# Patient Record
Sex: FEMALE
[De-identification: names, ages, dates, MRNs, and addresses within clinical notes are randomized per-mention and may not be internally consistent; named-entity substitution may affect disease eponyms.]

---

## 2017-08-12 ENCOUNTER — HEALTH MAINTENANCE LETTER (OUTPATIENT)
Age: 47
End: 2017-08-12

## 2024-12-18 ENCOUNTER — TRANSFERRED RECORDS (OUTPATIENT)
Dept: HEALTH INFORMATION MANAGEMENT | Facility: CLINIC | Age: 54
End: 2024-12-18

## 2024-12-30 ENCOUNTER — TRANSCRIBE ORDERS (OUTPATIENT)
Dept: OTHER | Age: 54
End: 2024-12-30

## 2024-12-30 ENCOUNTER — MEDICAL CORRESPONDENCE (OUTPATIENT)
Dept: HEALTH INFORMATION MANAGEMENT | Facility: CLINIC | Age: 54
End: 2024-12-30

## 2024-12-30 DIAGNOSIS — C55 LEIOMYOSARCOMA OF UTERUS (H): Primary | ICD-10-CM

## 2024-12-31 ENCOUNTER — TELEPHONE (OUTPATIENT)
Dept: PULMONOLOGY | Facility: CLINIC | Age: 54
End: 2024-12-31

## 2024-12-31 NOTE — TELEPHONE ENCOUNTER
Patient confirmed scheduled appointment:  Date: 01/24/2025  Time: 4:00PM  Visit type: NPULM REFERRAL  Provider: TEX  Location: [facility] PULM  Testing/imaging: N/A  Additional notes: Per clinic review, pt can start in gen pulm. Will bring insurance card to appt to scan into chart.

## 2025-01-20 ENCOUNTER — OFFICE VISIT (OUTPATIENT)
Dept: PULMONOLOGY | Facility: CLINIC | Age: 55
End: 2025-01-20
Payer: COMMERCIAL

## 2025-01-20 VITALS
WEIGHT: 157 LBS | HEART RATE: 100 BPM | OXYGEN SATURATION: 97 % | DIASTOLIC BLOOD PRESSURE: 74 MMHG | SYSTOLIC BLOOD PRESSURE: 118 MMHG

## 2025-01-20 DIAGNOSIS — R06.00 DYSPNEA, UNSPECIFIED TYPE: Primary | ICD-10-CM

## 2025-01-20 DIAGNOSIS — J20.9 ACUTE BRONCHITIS, UNSPECIFIED ORGANISM: ICD-10-CM

## 2025-01-20 DIAGNOSIS — R06.00 DYSPNEA: Primary | ICD-10-CM

## 2025-01-20 DIAGNOSIS — C78.01 MALIGNANT NEOPLASM METASTATIC TO BOTH LUNGS (H): ICD-10-CM

## 2025-01-20 DIAGNOSIS — C78.02 MALIGNANT NEOPLASM METASTATIC TO BOTH LUNGS (H): ICD-10-CM

## 2025-01-20 DIAGNOSIS — C55 LEIOMYOSARCOMA OF UTERUS (H): ICD-10-CM

## 2025-01-20 DIAGNOSIS — J39.8 TRACHEAL DEVIATION: ICD-10-CM

## 2025-01-20 RX ORDER — FUROSEMIDE 20 MG/1
20 TABLET ORAL DAILY
COMMUNITY
Start: 2024-12-23

## 2025-01-20 RX ORDER — DEXAMETHASONE 4 MG/1
2 TABLET ORAL
COMMUNITY

## 2025-01-20 RX ORDER — TRAZODONE HYDROCHLORIDE 100 MG/1
100 TABLET ORAL AT BEDTIME
COMMUNITY

## 2025-01-20 RX ORDER — INHALER, ASSIST DEVICES
SPACER (EA) MISCELLANEOUS
Qty: 1 EACH | Refills: 0 | Status: SHIPPED | OUTPATIENT
Start: 2025-01-20

## 2025-01-20 RX ORDER — PROCHLORPERAZINE MALEATE 10 MG
10 TABLET ORAL EVERY 6 HOURS PRN
COMMUNITY
Start: 2025-01-13

## 2025-01-20 RX ORDER — PALONOSETRON HYDROCHLORIDE 0.25 MG/2ML
0.25 INJECTION INTRAVENOUS PRN
COMMUNITY
Start: 2025-02-06

## 2025-01-20 RX ORDER — GABAPENTIN 300 MG/1
1 CAPSULE ORAL DAILY
COMMUNITY
Start: 2024-10-09

## 2025-01-20 RX ORDER — QUETIAPINE FUMARATE 100 MG/1
200 TABLET, FILM COATED ORAL DAILY
COMMUNITY
Start: 2024-10-31

## 2025-01-20 RX ORDER — ESCITALOPRAM OXALATE 5 MG/1
5 TABLET ORAL DAILY
COMMUNITY
Start: 2024-06-13

## 2025-01-20 RX ORDER — SENNOSIDES 8.6 MG/1
1 TABLET, COATED ORAL DAILY PRN
COMMUNITY
Start: 2024-08-05

## 2025-01-20 RX ORDER — HYDROMORPHONE HYDROCHLORIDE 2 MG/1
2 TABLET ORAL EVERY 6 HOURS PRN
COMMUNITY

## 2025-01-20 RX ORDER — ALPRAZOLAM 0.5 MG
0.5 TABLET ORAL
COMMUNITY
Start: 2024-06-13

## 2025-01-20 RX ORDER — ARIPIPRAZOLE 2 MG/1
2 TABLET ORAL DAILY
COMMUNITY
Start: 2024-06-13

## 2025-01-20 RX ORDER — BENZONATATE 100 MG/1
1 CAPSULE ORAL 3 TIMES DAILY PRN
COMMUNITY
Start: 2024-12-30

## 2025-01-20 RX ORDER — HYDROCORTISONE SODIUM SUCCINATE 100 MG/2ML
100 INJECTION INTRAMUSCULAR; INTRAVENOUS PRN
COMMUNITY
Start: 2024-10-09

## 2025-01-20 RX ORDER — ONDANSETRON 8 MG/1
8 TABLET, ORALLY DISINTEGRATING ORAL EVERY 8 HOURS PRN
COMMUNITY

## 2025-01-20 RX ORDER — HYDROCORTISONE 25 MG/G
CREAM TOPICAL PRN
COMMUNITY

## 2025-01-20 RX ORDER — LANOLIN ALCOHOL/MO/W.PET/CERES
400 CREAM (GRAM) TOPICAL DAILY
COMMUNITY
Start: 2024-01-29

## 2025-01-20 RX ORDER — VITAMIN B COMPLEX
5 TABLET ORAL DAILY
COMMUNITY

## 2025-01-20 RX ORDER — CYCLOBENZAPRINE HCL 5 MG
5 TABLET ORAL 3 TIMES DAILY PRN
COMMUNITY

## 2025-01-20 RX ORDER — LEVOTHYROXINE SODIUM 200 UG/1
200 TABLET ORAL
COMMUNITY
Start: 2025-01-02

## 2025-01-20 RX ORDER — PEGFILGRASTIM 6 MG/.6ML
6 INJECTION SUBCUTANEOUS PRN
COMMUNITY
Start: 2025-01-20

## 2025-01-20 RX ORDER — ALBUTEROL SULFATE 90 UG/1
2 INHALANT RESPIRATORY (INHALATION) EVERY 6 HOURS PRN
Qty: 18 G | Refills: 12 | Status: SHIPPED | OUTPATIENT
Start: 2025-01-20

## 2025-01-20 RX ORDER — UBIDECARENONE 75 MG
100 CAPSULE ORAL DAILY
COMMUNITY

## 2025-01-20 RX ORDER — DOXYCYCLINE 100 MG/1
100 CAPSULE ORAL 2 TIMES DAILY
Qty: 14 CAPSULE | Refills: 0 | Status: SHIPPED | OUTPATIENT
Start: 2025-01-20

## 2025-01-20 RX ORDER — ACETAMINOPHEN 500 MG
1000 TABLET ORAL EVERY 6 HOURS PRN
COMMUNITY

## 2025-01-20 RX ORDER — FERROUS SULFATE 325(65) MG
325 TABLET ORAL 2 TIMES DAILY
COMMUNITY
Start: 2024-10-23

## 2025-01-20 RX ORDER — ZOLPIDEM TARTRATE 12.5 MG/1
12.5 TABLET, FILM COATED, EXTENDED RELEASE ORAL
COMMUNITY

## 2025-01-20 NOTE — PROGRESS NOTES
PULMONARY OUTPATIENT CONSULT NOTE        Assessment:      Dyspnea   Related to progression metastasis both lungs.   Large RUL mass is deviating trachea and esophagus with shifting of mediastinum plays a major role, moderate right pleural effusion doubt is another contributor to her dyspnea. Patient is already scheduled for right side therapeutic thoracentesis.   Palliative surgical resection of large RUL mass is not on the table. Anyway I will discuss care with thoracic surgery.   Patient has received radiation therapy to the chest on 12/2024 and started on second line chemotherapy on 1/13/2025.   No desaturation with activities.   Denies sleeping problems.   Patient is scheduled for follow up CT scan to assess response to chemoradiation therapy in the next 3 months.   Acute bronchitis   Reports mild increase productive cough of yellow sputum. Denies fever, chills and night sweats.   Abx doxycycline , albuterol HFA as needed.   Patient has received dexamethasone with chemotherapy.   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   S/p radiation to chest and liver in an attempt to decrease metastasis and started on second line therapy with doxorubicin and dacarbazine 1/13/2025     Plan:      Albuterol HFA two puffs every six hours as needed, with spacer, cough or shortness of breath   Doxycycline 100 mg twice a day for 7 days   Patient is scheduled for thoracentesis   Contact me in 2 weeks to let me know how are you doing , clinic [phone], nurse Flores  Follow up in three months        Miko Anguiano  Pulmonary / Critical Care  January 20, 2025        CC:     Chief  Complaint   Patient presents with    Consult     CONSULT:  Per review, pt can be seen in gen pulm // DX: Symptomatic lung metastasis, Help with symptom management         HPI:      Kathleen Angel is a 54 year old female who presents for evaluation of dyspnea  Patient has history of Graves disease s/p ablation, hypothyroidism, paranoid schizophrenia, GERD, nephrolithiasis, uterine fibroids, later diagnosed metastatic leiomyosarcoma to liver, lungs and bones diagnosed on August 2024 s/p chemotherapy with Gemcitabine / Docetaxel and pelvic radiation, developed obstructive hydronephrosis left kidney due to local mass effect, concern for calyceal rupture, requiring left nephrostomy tube placement on 9/2024 and removed on 10/15/2024, peripheral neuropathy post chemotherapy. .   Follow up CT scan on 12/2024 showed progression of disease diffuse pulmonary metastasis, large RUL mass deviating trachea and esophagus with shifting of mediastinum, also increase hepatic metastasis, right pleural effusion and pericardial effusion.   Patient has received radiation to chest and liver in an attempt to decrease metastasis and started on second line therapy with doxorubicin and dacarbazine     Patient was referred for evaluation of ongoing dyspnea.   A new recent CT scan 1/7/25 showed interval progression of metastatic disease, diffuse pulmonary metastasis, large RUL mass deviating trachea and esophagus with shifting of mediastinum, also increase hepatic metastasis, right pleural effusion and pericardial effusion.   Patient is already scheduled for right side thoracentesis.   Reports progressive exertional dyspnea, worse over the last three weeks, able to walk a a half block before stopping. Patient was recently prescribed a wheelchair thru PCP.   Reports productive cough of yellowish sputum over the last week.   Denies fever, chills or night sweats. Denies hemoptysis.   No history of asthma or outdoor allergies. Denies postnasal drip,  headaches, sinus tenderness. Denies using inhalers.   Denies chest pain, orthopnea, PND, sleeps using one pillow, denies swelling of LEs   Wakes up least once to use the restroom at night.   No tobacco use.       Past Medical History :     Graves disease s/p ablation, hypothyroidism, paranoid schizophrenia, GERD, nephrolithiasis, uterine fibroids metastatic leiomyosarcoma to liver, lungs and bones diagnosed on August 2024 s/p chemotherapy with Gemcitabine / Docetaxel and pelvic radiation, developed obstructive hydronephrosis left kidney due to local mass effect, concern for calyceal rupture, requiring left nephrostomy tube placement on 9/2024 and removed on 10/15/2024, peripheral neuropathy post chemotherapy. .   Follow up CT scan on 12/2024 showed progression of disease diffuse pulmonary metastasis, large RUL mass deviating trachea and esophagus with shifting of mediastinum, also increase hepatic metastasis, right pleural effusion and pericardial effusion.   Patient has received radiation to chest and liver in an attempt to decrease metastasis and started on second line therapy with doxorubicin and dacarbazine     Medications:     Current Outpatient Medications   Medication Sig Dispense Refill    acetaminophen (TYLENOL) 500 MG tablet Take 1,000 mg by mouth every 6 hours as needed.      albuterol (PROAIR HFA/PROVENTIL HFA/VENTOLIN HFA) 108 (90 Base) MCG/ACT inhaler Inhale 2 puffs into the lungs every 6 hours as needed for shortness of breath, wheezing or cough. 18 g 12    ALPRAZolam (XANAX) 0.5 MG tablet Take 0.5 mg by mouth nightly as needed.      ARIPiprazole (ABILIFY) 2 MG tablet Take 2 mg by mouth daily.      benzonatate (TESSALON) 100 MG capsule Take 1 capsule by mouth 3 times daily as needed.      cyanocobalamin (VITAMIN B-12) 100 MCG tablet Take 100 mcg by mouth daily.      dexAMETHasone (DECADRON) 4 MG tablet Take 2 mg by mouth daily (with breakfast).      doxycycline hyclate (VIBRAMYCIN) 100 MG capsule Take  1 capsule (100 mg) by mouth 2 times daily. 14 capsule 0    escitalopram (LEXAPRO) 5 MG tablet Take 5 mg by mouth daily.      ferrous sulfate (FEROSUL) 325 (65 Fe) MG tablet Take 325 mg by mouth 2 times daily.      furosemide (LASIX) 20 MG tablet Take 20 mg by mouth daily.      gabapentin (NEURONTIN) 300 MG capsule Take 1 capsule by mouth daily.      HYDROmorphone (DILAUDID) 2 MG tablet Take 2 mg by mouth every 6 hours as needed for severe pain.      levothyroxine (SYNTHROID/LEVOTHROID) 200 MCG tablet Take 200 mcg by mouth every morning (before breakfast).      magic mouthwash suspension (diphenhydrAMINE, lidocaine, aluminum-magnesium & simethicone) Swish and swallow 10 mLs in mouth every 6 hours as needed for mouth sores.      melatonin 5 MG tablet Take 5 mg by mouth nightly as needed for sleep.      NONFORMULARY Chemo drug: doxorubicin and dacarbazine      ondansetron (ZOFRAN ODT) 8 MG ODT tab Take 8 mg by mouth every 8 hours as needed.      prochlorperazine (COMPAZINE) 10 MG tablet Take 10 mg by mouth every 6 hours as needed.      QUEtiapine (SEROQUEL) 100 MG tablet Take 200 mg by mouth daily. 800mg at HS      SENNA-TIME 8.6 MG tablet Take 1 tablet by mouth daily as needed.      spacer (OPTICHAMBER DINORAH) holding chamber Use it with inhaler 1 each 0    traZODone (DESYREL) 100 MG tablet Take 100 mg by mouth at bedtime.      Vitamin D3 (VITAMIN D, CHOLECALCIFEROL,) 25 mcg (1000 units) tablet Take 5 tablets by mouth daily.      zolpidem ER (AMBIEN CR) 12.5 MG CR tablet Take 12.5 mg by mouth nightly as needed for sleep.      [START ON 2/3/2025] aprepitant (CINVANTI) 130 MG/18ML EMUL injection Inject 130 mg into the vein once.      cyclobenzaprine (FLEXERIL) 5 MG tablet Take 5 mg by mouth 3 times daily as needed.      diphenhydrAMINE HCl (DIPHEN PO) Inject 50 mg into the vein as needed.      hydrocortisone 2.5 % cream Apply topically as needed.      hydrocortisone sodium succinate PF (SOLU-CORTEF) 100 mg/2 mL  injection Inject 100 mg into the vein as needed. (Patient not taking: Reported on 1/20/2025)      MAGNESIUM OXIDE 400 (240 Mg) MG tablet Take 400 mg by mouth daily. (Patient not taking: Reported on 1/20/2025)      methylprednisoLONE sodium succinate (SOLU-MEDROL) 2000 MG injection Inject 125 mg into the vein as needed. (Patient not taking: Reported on 1/20/2025)      [START ON 2/6/2025] Palonosetron HCl 0.25 MG/2ML SOLN Inject 0.25 mg into the vein as needed. (Patient not taking: Reported on 1/20/2025)      pegfilgrastim-jmdb (FULPHILA) 6 MG/0.6ML injection Inject 6 mg subcutaneously as needed. (Patient not taking: Reported on 1/20/2025)      spacer (OPTICHAMBER DINORAH) holding chamber Use with albuterol inhaler 1 each 0     No current facility-administered medications for this visit.        Social History :     Social History     Socioeconomic History    Marital status:      Spouse name: Not on file    Number of children: Not on file    Years of education: Not on file    Highest education level: Not on file   Occupational History    Not on file   Tobacco Use    Smoking status: Never    Smokeless tobacco: Never   Vaping Use    Vaping status: Never Used   Substance and Sexual Activity    Alcohol use: Not on file    Drug use: Not on file    Sexual activity: Not on file   Other Topics Concern    Not on file   Social History Narrative    Not on file     Social Drivers of Health     Financial Resource Strain: Low Risk  (6/28/2024)    Received from [facility]    Financial Resource Strain     Difficulty of Paying Living Expenses: 3     Difficulty of Paying Living Expenses: Not on file   Food Insecurity: No Food Insecurity (10/15/2024)    Received from [facility]    Food Insecurity     Do you worry your food will run out before you are able to buy more?: 1   Transportation Needs: No Transportation Needs (10/15/2024)    Received from [facility]    Transportation Needs     Does lack of transportation keep you from medical appointments?: 1     Does lack of transportation keep you from work, meetings or getting things that you need?: 1   Physical Activity: Not on file   Stress: Not on file   Social Connections: Socially Integrated (10/15/2024)    Received from [email or web address]    Social Connections     Do you often feel lonely or isolated from those around you?: 0   Interpersonal Safety: Not on file   Housing Stability: Low Risk  (10/15/2024)    Received from [email or web address]    Housing Stability     What is your housing situation today?: 1          Family History :     No family history on file.    Review of Systems  A 12 point comprehensive review of systems was negative except as noted.        Objective:     /74   Pulse 100   Wt 71.2 kg (157 lb)   SpO2 97%     Patient oxygen saturation on RA at rest is 97% pulse 99  Oxygen saturation after ambulating 400ft on RA is 98% pulse 113    Gen: awake, alert, no distress  HEENT: pink conjunctiva, moist mucosa, Mallampati II/IV  Neck: no thyromegaly, masses or JVD  Lungs: decrease breath sounds right base, discrete ronchi both HT  CV: regular, no murmurs or gallops appreciated  Abdomen: soft, NT, BS wnl  Ext: edema 1+ both legs  Neuro: awake, alert, non focal      Diagnostic tests:       IMAGES:     CT CHEST ABDOMEN PELVIS W   LOCATION: [facility]   DATE: 1/6/2025   INDICATION: Leiomyosarcoma Of Uterus (hc)   COMPARISON: Multiple priors, most recent 12/13/2024   FINDINGS:   LUNGS AND PLEURA: Interval increase in now moderate right-sided pleural fluid collection. Interval increase in size of the large right upper lung mass associated with the pleura with extension into the mediastinum. On axial images this measures 12.1 x 8.7 cm, previously 11.5 x 8.3 cm. Interval increase in size of pulmonary masses in the lung bases  with increasing atelectasis.   MEDIASTINUM/AXILLAE: Marked pericardial fluid. Normal caliber aorta. Right-sided Port-A-Cath.   CORONARY ARTERY CALCIFICATION: None.   HEPATOBILIARY: Minimal interval increase in size of hepatic metastasis measuring 9 x 8.9 cm on axial imaging. Cholecystectomy. No biliary dilatation.   PANCREAS: Normal.   SPLEEN: Normal.   ADRENAL GLANDS: Normal.   KIDNEYS/BLADDER: Symmetric renal enhancement. No urinary collecting system dilatation. Mild bladder wall thickening.   BOWEL: No obstruction or inflammatory change. Large amount of stool in the colon.   LYMPH NODES: No new lymphadenopathy. Aortocaval lymph nodes remain unchanged in size.   VASCULATURE: Normal caliber aorta. Patent celiac, SMA, bilateral renal arteries and TALAT. Patent portal, splenic and superior mesenteric veins.   PELVIC ORGANS: Essentially unchanged heterogeneously enhancing uterine mass measuring approximately 11 x 13 cm on axial imaging.   MUSCULOSKELETAL: Stable left lytic bone metastasis measuring 2.4 cm. Degenerative hypertrophic changes in the spine. Chronic bilateral L5 pars interarticularis defects.   Impression:  1.  Interval increase in size of pulmonary metastasis as detailed above.  2.  Interval increase in size of hepatic metastasis.  3.  Heterogeneously enhancing uterine mass essentially unchanged since prior study.  4.  Interval increase in atelectasis involving the right lung base with associated moderate right-sided pleural fluid.   5.  Stable left lytic bone metastasis.

## 2025-01-20 NOTE — PATIENT INSTRUCTIONS
Albuterol HFA two puffs every six hours as needed, with spacer, cough or shortness of breath   Doxycycline 100 mg twice a day for 7 days   Contact ,me in 2 weeks to let me know how are you doing , clinic [phone], nurse Mark  Follow up in three months

## 2025-01-20 NOTE — PROGRESS NOTES
Patient oxygen saturation on RA at rest is 97% pulse 99  Oxygen saturation after ambulating 400ft on RA is 98% pulse 113      Patient is ambulatory within his/her home.      Crystal Rowley LPN

## 2025-01-22 ENCOUNTER — MEDICAL CORRESPONDENCE (OUTPATIENT)
Dept: HEALTH INFORMATION MANAGEMENT | Facility: CLINIC | Age: 55
End: 2025-01-22
Payer: COMMERCIAL

## 2025-01-22 ENCOUNTER — TRANSFERRED RECORDS (OUTPATIENT)
Dept: HEALTH INFORMATION MANAGEMENT | Facility: CLINIC | Age: 55
End: 2025-01-22
Payer: COMMERCIAL

## 2025-01-28 ENCOUNTER — PATIENT OUTREACH (OUTPATIENT)
Dept: ONCOLOGY | Facility: CLINIC | Age: 55
End: 2025-01-28
Payer: COMMERCIAL

## 2025-01-28 ENCOUNTER — TRANSCRIBE ORDERS (OUTPATIENT)
Dept: OTHER | Age: 55
End: 2025-01-28

## 2025-01-28 DIAGNOSIS — C55 LEIOMYOSARCOMA OF UTERUS (H): Primary | ICD-10-CM

## 2025-01-28 DIAGNOSIS — C54.9: Primary | ICD-10-CM

## 2025-01-28 NOTE — PROGRESS NOTES
New Patient Hematology / Oncology Nurse Navigator Note     Referral Date: 1/28/25    Referring provider: Dr MARGARET Bernard    Referring Clinic/Organization: [facility]      Referred to: Medical Oncology    Requested provider (if applicable): Dr. Gottlieb    Evaluation for : Second opinion of metastatic uterine leiomyosarcoma for possible clinical trials or third line therapy consult     Clinical History (per Nurse review of records provided):    Records available through [facility]    Clinical Assessment / Barriers to Care (Per Nurse):    None identified at time of call, discuss referral concerns and what to expect at initial consult visit.      Records Location: Care Everywhere     Records Needed:     See Pre-Visit encounter from CSS team for additional details on records collection. Additional questions on records needed for initial consult can be sent to  ONC ADULT NEW PATIENT RECORDS [[id number]] with a copy to  CANCER CARE NURSE NAVIGATION [[id number]]. Please include diagnosis and urgency in subject line.    Additional testing needed prior to consult:     N/A    Referral updates and Plan:     Triage instructions updated and sent to NPS for completion      CELE GoodmanN, RN, PHN, OCN  Hematology/Oncology Nurse Navigator   [facility]   [phone]   [email or web address]

## 2025-02-10 NOTE — TELEPHONE ENCOUNTER
RECORDS STATUS - ALL OTHER DIAGNOSIS      RECORDS RECEIVED FROM: Ext: [facility]   NOTES STATUS DETAILS   OFFICE NOTE from referring provider Ext: [facility] 1/22/2025 - Dr. Makenzie Bernard   OFFICE NOTE from medical oncologist [facility]  8/23/2024 - Dr. Fang Mckeon   MEDICATION LIST [facility]    LABS     PATHOLOGY REPORTS Req from [facility]  8/29/2024 - [id number]   6/28/2024 - [id number]    ANYTHING RELATED TO DIAGNOSIS Ext: [facility] 1/13/2025   PATHOLOGY FEDEX TRACKING   TRACKING #:   IMAGING (NEED IMAGES & REPORT)     CT SCANS PACS    Req from [facility] PACS:  CT CAP: 1/6/2025, 12/13/2024  CT Chest: 8/13/2024    [facility]:  CT Abdomen Pelvis: 10/4/2024    [facility]:   CT Abdomen Pelvis: 8/6/2024   MRI Req from [facility]:   MR Pelvis: 5/27/2021   XRAYS     [facility] Liver: 8/29/2024  US Pelvis: 7/26/2024, 3/1/2024, 4/5/2021, 6/12/2020, 3/18/2020  US Abdomen: 5/13/2022    [facility]:   US Abdomen: 2/22/2022  US Pelvis: 5/12/2021   PET PACS PET Skull Base to Mid Thigh: 8/20/2024   IMAGE DISC FEDEX TRACKING   TRACKING #:

## 2025-03-10 NOTE — PROGRESS NOTES
"      3-12-25    I saw Carola Angel for a second opinion regarding a metastatic uterine leiomyosarcoma.  Her history is a bit complicated as described in the outside notes but in brief she was found to have a metastatic uterine leiomyosarcoma in August 2024.  She had a large abdominal mass that was painful which was treated with radiation therapy with decreasing pain.  On October 9 she began treatment with gemzar and Taxotere but progression was noted in December and she received radiation to the right upper lobe and liver lesion finishing on January 2.  She started doxorubicin and dacarbazine on January 13, 2025.  She has had 3 cycles the last ending about 10 days ago.  She has tolerated current treatment fairly well.  During the last couple of days she has developed some left-sided frontal and left side headaches and today has noticed double vision.  Her balance has also been off the last couple of days although she does seem to have a history of left foot drop that she was not aware of.  She is not eating that well but is sleeping okay and did actually do 2 miles on a stationary bike on Sunday.  She can walk around in a store okay with a cart and can go up 1 floor without stopping.  She does have a nonproductive cough and the abdominal mass she thinks is a bit more painful than a month ago.  She has some heartburn but has not done any treatment for that.  She does have bilateral numbness of fingers 4 and 5 and also some foot neuropathy that seemed to develop with the Taxotere mostly on the left.  Despite her psychiatric history she feels her mood today is \"fine.\"  She is actually seeing a dentist in the next couple of days and I asked her to see if she can get dental approval for the use of Xgeva should that be deemed useful in the future.    Her past history is well-documented in the accompanying notes and is notable for hypothyroidism, paranoid schizophrenia, anxiety depression, fibromyalgia, multiple surgical " "procedures including loli, T&A.    Family history is not particular contributory.    Social history reveals she is  and came with her  and sister.  She is a never smoker and is a .    NKDA    On exam she appeared comfortable with normal affect  /71 (BP Location: Right arm, Patient Position: Sitting, Cuff Size: Adult Regular)   Pulse (!) 121   Temp 99.9  F (37.7  C) (Oral)   Resp 16   Ht 1.6 m (5' 3\")   Wt 71.6 kg (157 lb 14.4 oz)   SpO2 97%   BMI 27.97 kg/m    HEENT full EOMs PER  NECK no obvious goiter or mass  CHEST no respiratory distress  NEURO normal mentation and speech, Romberg was fairly good that she could not heel walk and has foot drop on the left.  PSYCH appropriate mood    -  Outside NGS reported as showing a p53 mutation, ANJU, TMB low, ATRX mutation, in the ED 12 mutation, deletion of RB.    -  On 1-31 creat 0.72    -  I reviewed the available images showing the metastatic disease and large abd necrotic mass.  The images of 3-10-25 were not available but the preliminary read indicates progression.    CT-AP  10-4-24    1.  The uterine mass (leiomyosarcoma) remains overall stable in size in comparison to the MRI from September.     2.  Interval placement of left retroperitoneal drain with resolution of previously identified urinoma. Interval placement of left percutaneous nephrostomy catheter. The renal collecting system is decompressed.     3.  Enlarging right hepatic lobe metastasis.     4.  Enlarging and new pulmonary metastases.     5.  Enlarging lytic lesion in the left iliac wing, suspicious for metastatic disease   -  CT-AP 1-6-25  1.  Interval increase in size of pulmonary metastasis as detailed above.     2.  Interval increase in size of hepatic metastasis.     3.  Heterogeneously enhancing uterine mass essentially unchanged since prior study.     4.  Interval increase in atelectasis involving the right lung base with associated moderate " right-sided pleural fluid.     5.  Stable left lytic bone metastasis.   -    CT-CAP 3-10-25  IMPRESSION:  1. Progressive pulmonary metastatic disease.  2. Progressive hepatic metastases.  3. The necrotic uterine mass has decreased slightly in size.  4. Progressive destructive left iliac bone metastasis and new, small, lytic right iliac bone metastasis.  5. Nonobstructing left intrarenal calculi.     -  We discussed the situation.    Given the possible progression of the bone metastases I suggested she get approval from her dentist for Xgeva whom she is seeing in the next couple of days.    For the leiomyosarcoma I suggested trabectedin and briefly went over the toxicity of that and how it works.  Subsequent treatments could include pazopanib or eribulin.    I told her I was concerned with the new development of headaches and double vision and would like to get a head CT emergently.  Due to her insurance we will arrange that at [place] that has been set up for today.  She will follow-up with her oncologist on that.    All of their questions were addressed they will call if others arise.  t> 60 minutes including chart image review orders and documentation.    Bobby Gottlieb M.D.  Professor  Hematology, Oncology and Transplantation

## 2025-03-12 ENCOUNTER — PRE VISIT (OUTPATIENT)
Dept: ONCOLOGY | Facility: CLINIC | Age: 55
End: 2025-03-12
Payer: COMMERCIAL

## 2025-03-12 ENCOUNTER — TRANSCRIBE ORDERS (OUTPATIENT)
Dept: OTHER | Age: 55
End: 2025-03-12

## 2025-03-12 ENCOUNTER — ONCOLOGY VISIT (OUTPATIENT)
Dept: ONCOLOGY | Facility: CLINIC | Age: 55
End: 2025-03-12
Attending: OBSTETRICS & GYNECOLOGY
Payer: COMMERCIAL

## 2025-03-12 VITALS
DIASTOLIC BLOOD PRESSURE: 71 MMHG | RESPIRATION RATE: 16 BRPM | OXYGEN SATURATION: 97 % | WEIGHT: 157.9 LBS | HEIGHT: 63 IN | TEMPERATURE: 99.9 F | BODY MASS INDEX: 27.98 KG/M2 | SYSTOLIC BLOOD PRESSURE: 115 MMHG | HEART RATE: 121 BPM

## 2025-03-12 DIAGNOSIS — C78.01 MALIGNANT NEOPLASM METASTATIC TO BOTH LUNGS (H): Primary | ICD-10-CM

## 2025-03-12 DIAGNOSIS — H53.2 DOUBLE VISION: ICD-10-CM

## 2025-03-12 DIAGNOSIS — C49.A0 GIST (GASTROINTESTINAL STROMAL TUMOR), MALIGNANT (H): Primary | ICD-10-CM

## 2025-03-12 DIAGNOSIS — R51.9 ACUTE NONINTRACTABLE HEADACHE, UNSPECIFIED HEADACHE TYPE: ICD-10-CM

## 2025-03-12 DIAGNOSIS — C49.9 LEIOMYOSARCOMA (H): ICD-10-CM

## 2025-03-12 DIAGNOSIS — C78.02 MALIGNANT NEOPLASM METASTATIC TO BOTH LUNGS (H): Primary | ICD-10-CM

## 2025-03-12 PROCEDURE — 99213 OFFICE O/P EST LOW 20 MIN: CPT | Performed by: INTERNAL MEDICINE

## 2025-03-12 RX ORDER — QUETIAPINE FUMARATE 200 MG/1
TABLET, FILM COATED ORAL
COMMUNITY
Start: 2025-02-03

## 2025-03-12 RX ORDER — GABAPENTIN 600 MG/1
TABLET ORAL
COMMUNITY
Start: 2025-03-02

## 2025-03-12 ASSESSMENT — PAIN SCALES - GENERAL: PAINLEVEL_OUTOF10: MILD PAIN (3)

## 2025-03-12 NOTE — LETTER
"3/12/2025      Kathleen Angel  [address]  [place] [address]      Dear Colleague,    Thank you for referring your patient, Kathleen Angel, to the [facility]. Please see a copy of my visit note below.          3-12-25    I saw Carola Angel for a second opinion regarding a metastatic uterine leiomyosarcoma.  Her history is a bit complicated as described in the outside notes but in brief she was found to have a metastatic uterine leiomyosarcoma in August 2024.  She had a large abdominal mass that was painful which was treated with radiation therapy with decreasing pain.  On October 9 she began treatment with gemzar and Taxotere but progression was noted in December and she received radiation to the right upper lobe and liver lesion finishing on January 2.  She started doxorubicin and dacarbazine on January 13, 2025.  She has had 3 cycles the last ending about 10 days ago.  She has tolerated current treatment fairly well.  During the last couple of days she has developed some left-sided frontal and left side headaches and today has noticed double vision.  Her balance has also been off the last couple of days although she does seem to have a history of left foot drop that she was not aware of.  She is not eating that well but is sleeping okay and did actually do 2 miles on a stationary bike on Sunday.  She can walk around in a store okay with a cart and can go up 1 floor without stopping.  She does have a nonproductive cough and the abdominal mass she thinks is a bit more painful than a month ago.  She has some heartburn but has not done any treatment for that.  She does have bilateral numbness of fingers 4 and 5 and also some foot neuropathy that seemed to develop with the Taxotere mostly on the left.  Despite her psychiatric history she feels her mood today is \"fine.\"  She is actually seeing a dentist in the next couple of days and I asked her to see if she can get dental approval " "for the use of Xgeva should that be deemed useful in the future.    Her past history is well-documented in the accompanying notes and is notable for hypothyroidism, paranoid schizophrenia, anxiety depression, fibromyalgia, multiple surgical procedures including loli, T&A.    Family history is not particular contributory.    Social history reveals she is  and came with her  and sister.  She is a never smoker and is a .    NKDA    On exam she appeared comfortable with normal affect  /71 (BP Location: Right arm, Patient Position: Sitting, Cuff Size: Adult Regular)   Pulse (!) 121   Temp 99.9  F (37.7  C) (Oral)   Resp 16   Ht 1.6 m (5' 3\")   Wt 71.6 kg (157 lb 14.4 oz)   SpO2 97%   BMI 27.97 kg/m    HEENT full EOMs PER  NECK no obvious goiter or mass  CHEST no respiratory distress  NEURO normal mentation and speech, Romberg was fairly good that she could not heel walk and has foot drop on the left.  PSYCH appropriate mood    -  Outside NGS reported as showing a p53 mutation, ANJU, TMB low, ATRX mutation, in the ED 12 mutation, deletion of RB.    -  On 1-31 creat 0.72    -  I reviewed the available images showing the metastatic disease and large abd necrotic mass.  The images of 3-10-25 were not available but the preliminary read indicates progression.    CT-AP  10-4-24    1.  The uterine mass (leiomyosarcoma) remains overall stable in size in comparison to the MRI from September.     2.  Interval placement of left retroperitoneal drain with resolution of previously identified urinoma. Interval placement of left percutaneous nephrostomy catheter. The renal collecting system is decompressed.     3.  Enlarging right hepatic lobe metastasis.     4.  Enlarging and new pulmonary metastases.     5.  Enlarging lytic lesion in the left iliac wing, suspicious for metastatic disease   -  CT-AP 1-6-25  1.  Interval increase in size of pulmonary metastasis as detailed above. "     2.  Interval increase in size of hepatic metastasis.     3.  Heterogeneously enhancing uterine mass essentially unchanged since prior study.     4.  Interval increase in atelectasis involving the right lung base with associated moderate right-sided pleural fluid.     5.  Stable left lytic bone metastasis.   -    CT-CAP 3-10-25  IMPRESSION:  1. Progressive pulmonary metastatic disease.  2. Progressive hepatic metastases.  3. The necrotic uterine mass has decreased slightly in size.  4. Progressive destructive left iliac bone metastasis and new, small, lytic right iliac bone metastasis.  5. Nonobstructing left intrarenal calculi.     -  We discussed the situation.    Given the possible progression of the bone metastases I suggested she get approval from her dentist for Xgeva whom she is seeing in the next couple of days.    For the leiomyosarcoma I suggested trabectedin and briefly went over the toxicity of that and how it works.  Subsequent treatments could include pazopanib or eribulin.    I told her I was concerned with the new development of headaches and double vision and would like to get a head CT emergently.  Due to her insurance we will arrange that at [place] that has been set up for today.  She will follow-up with her oncologist on that.    All of their questions were addressed they will call if others arise.  t> 60 minutes including chart image review orders and documentation.    Bobby Gottlieb M.D.  Professor  Hematology, Oncology and Transplantation      Again, thank you for allowing me to participate in the care of your patient.        Sincerely,        Bobby Gottlieb MD    Electronically signed

## 2025-03-12 NOTE — NURSING NOTE
"Oncology Rooming Note    March 12, 2025 1:48 PM   Kathleen Angel is a 54 year old female who presents for:    Chief Complaint   Patient presents with    Oncology Clinic Visit     Leiomyosarcoma of uterus     Initial Vitals: /71 (BP Location: Right arm, Patient Position: Sitting, Cuff Size: Adult Regular)   Pulse (!) 121   Temp 99.9  F (37.7  C) (Oral)   Resp 16   Ht 1.6 m (5' 3\")   Wt 71.6 kg (157 lb 14.4 oz)   SpO2 97%   BMI 27.97 kg/m   Estimated body mass index is 27.97 kg/m  as calculated from the following:    Height as of this encounter: 1.6 m (5' 3\").    Weight as of this encounter: 71.6 kg (157 lb 14.4 oz). Body surface area is 1.78 meters squared.  Mild Pain (3) Comment: Data Unavailable   No LMP recorded.  Allergies reviewed: Yes  Medications reviewed: No    Medications: Medication refills not needed today.  Pharmacy name entered into [facility]: [facility] - [address]    Frailty Screening:   Is the patient here for a new oncology consult visit in cancer care? 1. Yes. Over the past month, have you experienced difficulty or required a caregiver to assist with:   1. Balance, walking or general mobility (including any falls)? Undetermined    2. Completion of self-care tasks such as bathing, dressing, toileting, grooming/hygiene?  Undetermined    3. Concentration or memory that affects your daily life?  Undetermined      PHQ9:  Did this patient require a PHQ9?: No    Clinical concerns: slight double vision started today, some vertigo, headache currently, some left arm pain earlier in the day.     Provider entered the room prior to frailty questions & PHQ-2    Eddi Kumar        "